# Patient Record
Sex: FEMALE | Race: OTHER | HISPANIC OR LATINO | URBAN - METROPOLITAN AREA
[De-identification: names, ages, dates, MRNs, and addresses within clinical notes are randomized per-mention and may not be internally consistent; named-entity substitution may affect disease eponyms.]

---

## 2023-06-23 ENCOUNTER — EMERGENCY (EMERGENCY)
Facility: HOSPITAL | Age: 43
LOS: 1 days | Discharge: HOME | End: 2023-06-23
Attending: EMERGENCY MEDICINE
Payer: MEDICAID

## 2023-06-23 VITALS
SYSTOLIC BLOOD PRESSURE: 129 MMHG | WEIGHT: 163.14 LBS | DIASTOLIC BLOOD PRESSURE: 84 MMHG | HEART RATE: 83 BPM | OXYGEN SATURATION: 98 % | TEMPERATURE: 99 F | RESPIRATION RATE: 18 BRPM

## 2023-06-23 LAB
BASOPHILS # BLD AUTO: 0.03 K/UL — SIGNIFICANT CHANGE UP (ref 0–0.2)
BASOPHILS NFR BLD AUTO: 0.4 % — SIGNIFICANT CHANGE UP (ref 0–2)
EOSINOPHIL # BLD AUTO: 0.08 K/UL — SIGNIFICANT CHANGE UP (ref 0–0.5)
EOSINOPHIL NFR BLD AUTO: 1 % — SIGNIFICANT CHANGE UP (ref 0–6)
HCT VFR BLD CALC: 40.9 % — SIGNIFICANT CHANGE UP (ref 34.5–45)
HGB BLD-MCNC: 13.4 G/DL — SIGNIFICANT CHANGE UP (ref 11.5–15.5)
IMM GRANULOCYTES NFR BLD AUTO: 0.2 % — SIGNIFICANT CHANGE UP (ref 0–0.9)
LYMPHOCYTES # BLD AUTO: 2.84 K/UL — SIGNIFICANT CHANGE UP (ref 1–3.3)
LYMPHOCYTES # BLD AUTO: 35.3 % — SIGNIFICANT CHANGE UP (ref 13–44)
MCHC RBC-ENTMCNC: 29.6 PG — SIGNIFICANT CHANGE UP (ref 27–34)
MCHC RBC-ENTMCNC: 32.8 GM/DL — SIGNIFICANT CHANGE UP (ref 32–36)
MCV RBC AUTO: 90.5 FL — SIGNIFICANT CHANGE UP (ref 80–100)
MONOCYTES # BLD AUTO: 0.62 K/UL — SIGNIFICANT CHANGE UP (ref 0–0.9)
MONOCYTES NFR BLD AUTO: 7.7 % — SIGNIFICANT CHANGE UP (ref 2–14)
NEUTROPHILS # BLD AUTO: 4.45 K/UL — SIGNIFICANT CHANGE UP (ref 1.8–7.4)
NEUTROPHILS NFR BLD AUTO: 55.4 % — SIGNIFICANT CHANGE UP (ref 43–77)
NRBC # BLD: 0 /100 WBCS — SIGNIFICANT CHANGE UP (ref 0–0)
PLATELET # BLD AUTO: 282 K/UL — SIGNIFICANT CHANGE UP (ref 150–400)
RBC # BLD: 4.52 M/UL — SIGNIFICANT CHANGE UP (ref 3.8–5.2)
RBC # FLD: 12.8 % — SIGNIFICANT CHANGE UP (ref 10.3–14.5)
WBC # BLD: 8.04 K/UL — SIGNIFICANT CHANGE UP (ref 3.8–10.5)
WBC # FLD AUTO: 8.04 K/UL — SIGNIFICANT CHANGE UP (ref 3.8–10.5)

## 2023-06-23 PROCEDURE — 99284 EMERGENCY DEPT VISIT MOD MDM: CPT

## 2023-06-23 RX ORDER — KETOROLAC TROMETHAMINE 30 MG/ML
30 SYRINGE (ML) INJECTION ONCE
Refills: 0 | Status: DISCONTINUED | OUTPATIENT
Start: 2023-06-23 | End: 2023-06-23

## 2023-06-23 NOTE — ED ADULT TRIAGE NOTE - WEIGHT IN KG
Psychological condition is improving with treatment.  Regular aerobic exercise.  Medication changes per orders.  Referral to psychological counseling.Advised to decrease clonazepam gradually and stop over the period of 6 weeks ,decrease 0.25 every 2 weeks  Psychological condition will be reassessed in 4 weeks.  
73

## 2023-06-24 VITALS
DIASTOLIC BLOOD PRESSURE: 87 MMHG | SYSTOLIC BLOOD PRESSURE: 143 MMHG | RESPIRATION RATE: 16 BRPM | OXYGEN SATURATION: 100 % | HEART RATE: 55 BPM | TEMPERATURE: 98 F

## 2023-06-24 LAB
ALBUMIN SERPL ELPH-MCNC: 4.2 G/DL — SIGNIFICANT CHANGE UP (ref 3.5–5)
ALP SERPL-CCNC: 64 U/L — SIGNIFICANT CHANGE UP (ref 40–120)
ALT FLD-CCNC: 19 U/L DA — SIGNIFICANT CHANGE UP (ref 10–60)
ANION GAP SERPL CALC-SCNC: 6 MMOL/L — SIGNIFICANT CHANGE UP (ref 5–17)
AST SERPL-CCNC: 14 U/L — SIGNIFICANT CHANGE UP (ref 10–40)
BILIRUB SERPL-MCNC: 0.5 MG/DL — SIGNIFICANT CHANGE UP (ref 0.2–1.2)
BUN SERPL-MCNC: 10 MG/DL — SIGNIFICANT CHANGE UP (ref 7–18)
CALCIUM SERPL-MCNC: 9 MG/DL — SIGNIFICANT CHANGE UP (ref 8.4–10.5)
CHLORIDE SERPL-SCNC: 108 MMOL/L — SIGNIFICANT CHANGE UP (ref 96–108)
CO2 SERPL-SCNC: 28 MMOL/L — SIGNIFICANT CHANGE UP (ref 22–31)
CREAT SERPL-MCNC: 0.67 MG/DL — SIGNIFICANT CHANGE UP (ref 0.5–1.3)
EGFR: 111 ML/MIN/1.73M2 — SIGNIFICANT CHANGE UP
GLUCOSE SERPL-MCNC: 107 MG/DL — HIGH (ref 70–99)
POTASSIUM SERPL-MCNC: 3.9 MMOL/L — SIGNIFICANT CHANGE UP (ref 3.5–5.3)
POTASSIUM SERPL-SCNC: 3.9 MMOL/L — SIGNIFICANT CHANGE UP (ref 3.5–5.3)
PROT SERPL-MCNC: 7.8 G/DL — SIGNIFICANT CHANGE UP (ref 6–8.3)
SODIUM SERPL-SCNC: 142 MMOL/L — SIGNIFICANT CHANGE UP (ref 135–145)

## 2023-06-24 PROCEDURE — 96374 THER/PROPH/DIAG INJ IV PUSH: CPT

## 2023-06-24 PROCEDURE — 99284 EMERGENCY DEPT VISIT MOD MDM: CPT | Mod: 25

## 2023-06-24 PROCEDURE — 80053 COMPREHEN METABOLIC PANEL: CPT

## 2023-06-24 PROCEDURE — 85025 COMPLETE CBC W/AUTO DIFF WBC: CPT

## 2023-06-24 PROCEDURE — 36415 COLL VENOUS BLD VENIPUNCTURE: CPT

## 2023-06-24 PROCEDURE — 82962 GLUCOSE BLOOD TEST: CPT

## 2023-06-24 RX ADMIN — Medication 30 MILLIGRAM(S): at 01:01

## 2023-06-24 NOTE — ED PROVIDER NOTE - CLINICAL SUMMARY MEDICAL DECISION MAKING FREE TEXT BOX
41 y/o woman, no PMH, c/o right submandibular swelling and discomfort since about 5 pm.  Mild discomfort with swallowing but able to swallow--suspect sialolithiasis vs. reactive lymph node, will check labs and give Toradol and reassess.

## 2023-06-24 NOTE — ED PROVIDER NOTE - NSFOLLOWUPCLINICS_GEN_ALL_ED_FT
[facility]  [address] [place] [address]  Phone: [phone]  Fax: [phone]  Follow Up Time: 4-6 Days

## 2023-06-24 NOTE — ED ADULT NURSE NOTE - NSFALLUNIVINTERV_ED_ALL_ED
Bed/Stretcher in lowest position, wheels locked, appropriate side rails in place/Call bell, personal items and telephone in reach/Instruct patient to call for assistance before getting out of bed/chair/stretcher/Non-slip footwear applied when patient is off stretcher/[place] to call system/Physically safe environment - no spills, clutter or unnecessary equipment/Purposeful proactive rounding/Room/bathroom lighting operational, light cord in reach

## 2023-06-24 NOTE — ED PROVIDER NOTE - NSFOLLOWUPINSTRUCTIONS_ED_ALL_ED_FT
Turk[place]    Cálculos salivales  Salivary Stone  Side view of a person's head showing the parotid, sublingual, and submandibular salivary glands.  Un cálculo salival es un pequeño aglutinamiento de mineral (depósito mineral) que se forma en los tubos (conductos) de drenaje de las glándulas salivales. La mayoría de los cálculos salivales están compuestos por calcio. Cuando se forma un cálculo, la saliva puede acumularse en la glándula y causar taye hinchazón dolorosa.    Las glándulas salivales son las encargadas de producir saliva. Las glándulas salivales más importantes son seis. Cada glándula tiene un conducto que lleva la saliva a la boca. La saliva mantiene la boca húmeda y participa en la deglución de los alimentos. También ayuda a prevenir las caries dentales.    Dos de las glándulas salivales se encuentran mohsen renuka de las orejas (parótidas). Los conductos de estas glándulas se abren dentro de los pómulos, cerca de los dientes de atrás. También hay dos glándulas debajo de la lengua (sublinguales) y dos glándulas debajo de la mandíbula (submandibulares). Los conductos de estas glándulas se abren debajo de la lengua. Pueden formarse cálculos en cualquiera de las glándulas salivales. Sin embargo, es más frecuente que se formen en taye glándula salival submandibular.    ¿Cuáles son las causas?  La causa de los cálculos salivales puede ser cualquier afección que disminuya el flujo de saliva. Se desconoce el motivo por el cual algunas personas presentan cálculos.    ¿Qué incrementa el riesgo?  Es más probable que desarrolle esta afección si:  No yuniel suficiente agua.  Fuma.  Tiene alguno de estos síntomas:  Presión arterial daniel.  Gota.  Diabetes.  ¿Cuáles son los signos o síntomas?  El signo principal de un cálculo salival es la hinchazón repentina de dicha glándula al comer. Por lo general, se produce debajo de la mandíbula, en un costado. Otros signos y síntomas pueden incluir los siguientes:  Hinchazón de la mejilla o debajo de la lengua al comer.  Dolor en el área hinchada.  Dificultad para masticar o tragar.  Hinchazón que desaparece después de comer.  A veces, el cálculo salival se puede corie. El cálculo tiene forma ovalada y puede ser de color roblero o amarillo.    ¿Cómo se diagnostica?  Esta afección se puede diagnosticar en función de lo siguiente:  Lalo signos y síntomas.  Un examen físico. En muchos casos, el médico podrá sentir el cálculo en un conducto dentro de la boca.  Estudios de diagnóstico por imágenes, por ejemplo:  Radiografías.  Ecografía.  Exploración por tomografía computarizada (TC).  Resonancia magnética (RM).  Es posible que deba consultar a un especialista en oído, nariz y garganta (ORL u otorrinolaringólogo) para recibir un diagnóstico y tratamiento.    ¿Cómo se trata?  El tratamiento de esta afección depende del tamaño del cálculo.  Un cálculo pequeño que no causa síntomas se puede tratar con cuidado en el hogar.  Si la lola es lo suficientemente lilibeth charlene para causar síntomas, puede ser tratada con lo siguiente:  Introducir taye sonda y ensanchar el conducto para permitir que el cálculo pase.  Colocar un tubo baker y flexible (endoscopio) en el conducto para encontrar el cálculo y eliminarlo.  Desintegrar el cálculo con ondas sonoras.  Extraer la glándula salival entera.  Siga estas instrucciones en cruz casa:  Para aliviar las molestias    [place] antiinflamatorios no esteroideos (HAYES), charlene ibuprofeno, para aliviar el dolor y la hinchazón charlene se lo haya indicado el médico.  Siga estas instrucciones cada dos o tea horas:  Chupe un caramelo de nuno o taye pastilla de vitamina C para estimular el flujo de saliva.  Coloque un paño húmedo y tibio (compresatibia) sobre la glándula.  Masajee con cuidado la glándula.  Instrucciones generales    A sign showing that a person should not smoke.  Use los medicamentos de venta inge y los recetados solamente charlene se lo haya indicado el médico.  Sveta suficiente líquido charlene para mantener la orina de color amarillo pálido.  No consuma ningún producto que contenga nicotina o tabaco. Estos productos incluyen cigarrillos, tabaco para mascar y aparatos de vapeo, charlene los cigarrillos electrónicos. Si necesita ayuda para dejar de fumar, consulte al médico.  Concurra a todas las visitas de seguimiento. [place] es importante.  Comuníquese con un médico si:  Tiene dolor e hinchazón en la wandy, la mandíbula o la boca después de comer.  Tiene hinchazón que aparece taye y otra vez en cualquiera de estos lugares:  Frente a la oreja.  Debajo de la mandíbula.  Dentro de la boca.  Solicite ayuda de inmediato si:  Tiene dolor e hinchazón en la wandy, la mandíbula o la boca que empeoran repentinamente.  El dolor y la hinchazón dificultan que trague, hable o respire.  Estos síntomas pueden indicar taye emergencia. Solicite ayuda de inmediato. Llame al 911.  No espere a corie si los síntomas desaparecen.  No conduzca por lalo propios medios hasta el hospital.  Resumen  Un cálculo salival es un pequeño aglutinamiento de mineral (depósito mineral) que se forma en los conductos de drenaje de las glándulas salivales.  Cuando se forma un cálculo, la saliva puede acumularse en la glándula y causar taye hinchazón dolorosa.  La causa de los cálculos salivales puede ser cualquier afección que disminuya el flujo de saliva.  El tratamiento de esta afección depende del tamaño del cálculo.  Esta información no tiene hcarlene fin reemplazar el consejo del médico. Asegúrese de hacerle al médico cualquier pregunta que tenga.    Document Revised: 01/19/2023 Document Reviewed: 01/19/2023  Elsevier Patient Education © 2023 [employer]

## 2023-06-24 NOTE — ED PROVIDER NOTE - PATIENT PORTAL LINK FT
You can access the FollowMyHealth Patient Portal offered by [facility] by registering at the following website: [email or web address]. By joining [employer] [facility]’s FollowMyHealth portal, you will also be able to view your health information using other applications (apps) compatible with our system.

## 2023-06-24 NOTE — ED PROVIDER NOTE - PHYSICAL EXAMINATION
right submandibular area with swelling and tenderness, no erythema/fluctuance/induration in the area; no trismus/crepitus/stridor

## 2023-06-24 NOTE — ED PROVIDER NOTE - OBJECTIVE STATEMENT
41 y/o woman, no PMH, c/o right submandibular swelling and discomfort since about 5 pm.  Mild discomfort with swallowing but able to swallow.  No SOB/fever/chills.  Has not had similar symptoms previously. 41 y/o woman, no PMH, c/o right submandibular swelling and discomfort since about 5 pm.  Mild discomfort with swallowing but able to swallow.  No SOB/fever/chills.  Has not had similar symptoms previously.   #[id number].

## 2023-06-24 NOTE — ED PROVIDER NOTE - ENMT, MLM
Airway patent, Nasal mucosa clear. Mouth with normal mucosa. Throat has no vesicles, no oropharyngeal exudates/erythema/edema and uvula is midline.